# Patient Record
Sex: MALE | Race: WHITE | NOT HISPANIC OR LATINO | URBAN - METROPOLITAN AREA
[De-identification: names, ages, dates, MRNs, and addresses within clinical notes are randomized per-mention and may not be internally consistent; named-entity substitution may affect disease eponyms.]

---

## 2017-03-28 PROBLEM — 302914006 BARRETT'S ESOPHAGUS: Status: ACTIVE | Noted: 2017-03-28

## 2021-06-15 ENCOUNTER — OFFICE VISIT (OUTPATIENT)
Dept: URBAN - METROPOLITAN AREA CLINIC 27 | Facility: CLINIC | Age: 81
End: 2021-06-15

## 2021-06-16 ENCOUNTER — LAB OUTSIDE AN ENCOUNTER (OUTPATIENT)
Dept: URBAN - METROPOLITAN AREA CLINIC 121 | Facility: CLINIC | Age: 81
End: 2021-06-16

## 2021-06-16 LAB
B-12: 518
BASOPH COUNT: (no result)
BASOPHIL %: 0.5
EOS COUNT: (no result)
EOSINOPHIL %: 0.4
FERRITIN: 35
FOLATE: 10.3
HCT: 36.2
HGB: 11.6
IRON SATUR %: (no result)
IRON: 88
LYMPHS %: 27.1
MCH: 30.4
MCHC: (no result)
MCV: 95
MONOCYTE %: 12.5
MONOSCT AUTO: (no result)
PLATELETS: (no result)
PMN %: 59.5
RBC: (no result)
RDW: 13.6
RETIC COUNT: 1.3
TIBC: (no result)
WBC: (no result)
ZZ-GE-UNK: (no result)
ZZ-GE-UNK: (no result)

## 2021-08-02 ENCOUNTER — OFFICE VISIT (OUTPATIENT)
Dept: URBAN - METROPOLITAN AREA SURGERY CENTER 7 | Facility: SURGERY CENTER | Age: 81
End: 2021-08-02

## 2021-08-03 ENCOUNTER — LAB OUTSIDE AN ENCOUNTER (OUTPATIENT)
Dept: URBAN - METROPOLITAN AREA CLINIC 121 | Facility: CLINIC | Age: 81
End: 2021-08-03

## 2021-08-03 LAB
B-12: 544
BASOPH COUNT: (no result)
BASOPHIL %: 0.9
EOS COUNT: (no result)
EOSINOPHIL %: 0.5
FERRITIN: 32
FOLATE: 9.2
HCT: 37.2
HGB: 11.6
IRON SATUR %: (no result)
IRON: 58
LYMPHS %: 20.5
MCH: 29.7
MCHC: (no result)
MCV: 95.1
MONOCYTE %: 9.8
MONOSCT AUTO: (no result)
PLATELETS: (no result)
PMN %: 68.3
RBC: (no result)
RDW: 13.3
RETIC COUNT: 1.4
TIBC: (no result)
WBC: (no result)
ZZ-GE-UNK: (no result)
ZZ-GE-UNK: (no result)

## 2021-08-06 ENCOUNTER — OFFICE VISIT (OUTPATIENT)
Dept: URBAN - METROPOLITAN AREA CLINIC 27 | Facility: CLINIC | Age: 81
End: 2021-08-06

## 2021-10-19 ENCOUNTER — OFFICE VISIT (OUTPATIENT)
Dept: URBAN - METROPOLITAN AREA CLINIC 27 | Facility: CLINIC | Age: 81
End: 2021-10-19

## 2022-04-30 ENCOUNTER — TELEPHONE ENCOUNTER (OUTPATIENT)
Dept: URBAN - METROPOLITAN AREA CLINIC 121 | Facility: CLINIC | Age: 82
End: 2022-04-30

## 2022-04-30 RX ORDER — ALPRAZOLAM 0.25 MG/1
TABLET ORAL
OUTPATIENT
Start: 2015-07-14

## 2022-04-30 RX ORDER — FAMOTIDINE 10 MG
1 TABLET PO BID TABLET ORAL
OUTPATIENT
Start: 2017-03-28

## 2022-04-30 RX ORDER — ALPRAZOLAM 0.25 MG/1
TABLET ORAL
OUTPATIENT
Start: 2017-05-15 | End: 2019-11-08

## 2022-04-30 RX ORDER — ALPRAZOLAM 0.25 MG/1
TABLET ORAL
OUTPATIENT
Start: 2015-07-14 | End: 2017-03-28

## 2022-04-30 RX ORDER — OMEPRAZOLE 20 MG/1
1 CAPSULE PO QD CAPSULE, DELAYED RELEASE ORAL
OUTPATIENT
Start: 2015-07-27 | End: 2017-03-28

## 2022-04-30 RX ORDER — POLYETHYLENE GLYCOL 400 AND PROPYLENE GLYCOL 4; 3 MG/ML; MG/ML
SOLUTION/ DROPS OPHTHALMIC
OUTPATIENT
Start: 2017-03-28

## 2022-04-30 RX ORDER — OMEPRAZOLE 20 MG/1
1 CAPSULE PO QD CAPSULE, DELAYED RELEASE ORAL
OUTPATIENT
Start: 2015-07-27

## 2022-04-30 RX ORDER — OMEPRAZOLE 20 MG/1
1 CAPSULE PO QD CAPSULE, DELAYED RELEASE ORAL
OUTPATIENT
Start: 2015-07-14 | End: 2015-07-14

## 2022-04-30 RX ORDER — FAMOTIDINE 10 MG
TABLET ORAL
OUTPATIENT
Start: 2017-05-15 | End: 2019-11-08

## 2022-04-30 RX ORDER — ALPRAZOLAM 0.25 MG/1
TABLET ORAL
OUTPATIENT
Start: 2017-05-15

## 2022-04-30 RX ORDER — FAMOTIDINE 10 MG
TABLET ORAL
OUTPATIENT
Start: 2017-05-15

## 2022-04-30 RX ORDER — DUTASTERIDE 0.5 MG
CAPSULE ORAL
OUTPATIENT
Start: 2015-07-14

## 2022-04-30 RX ORDER — POLYETHYLENE GLYCOL 400 AND PROPYLENE GLYCOL 4; 3 MG/ML; MG/ML
SOLUTION/ DROPS OPHTHALMIC
OUTPATIENT
Start: 2017-03-28 | End: 2019-11-08

## 2022-04-30 RX ORDER — OMEPRAZOLE 20 MG/1
1 CAPSULE PO QD CAPSULE, DELAYED RELEASE ORAL
OUTPATIENT
Start: 2015-07-14

## 2022-04-30 RX ORDER — FAMOTIDINE 10 MG
1 TABLET PO BID TABLET ORAL
OUTPATIENT
Start: 2018-03-13

## 2022-04-30 RX ORDER — FAMOTIDINE 10 MG
1 TABLET PO BID TABLET ORAL
OUTPATIENT
Start: 2018-03-13 | End: 2019-11-08

## 2022-04-30 RX ORDER — OMEPRAZOLE 20 MG/1
TABLET, DELAYED RELEASE ORAL
OUTPATIENT
Start: 2015-07-14 | End: 2017-05-15

## 2022-04-30 RX ORDER — FAMOTIDINE 10 MG
1 TABLET PO BID TABLET ORAL
OUTPATIENT
Start: 2017-03-28 | End: 2017-03-28

## 2022-04-30 RX ORDER — DUTASTERIDE 0.5 MG
CAPSULE ORAL
OUTPATIENT
Start: 2015-07-14 | End: 2017-03-28

## 2022-05-01 ENCOUNTER — TELEPHONE ENCOUNTER (OUTPATIENT)
Dept: URBAN - METROPOLITAN AREA CLINIC 121 | Facility: CLINIC | Age: 82
End: 2022-05-01

## 2022-05-01 RX ORDER — FAMOTIDINE 10 MG
TAKE 1 TABLET TWICE A DAY TABLET ORAL
Status: ACTIVE | COMMUNITY
Start: 2019-06-10

## 2022-05-01 RX ORDER — UREA 10 %
LOTION (ML) TOPICAL
Status: ACTIVE | COMMUNITY
Start: 2021-06-15

## 2022-05-01 RX ORDER — ROSUVASTATIN CALCIUM 20 MG
TABLET ORAL
Status: ACTIVE | COMMUNITY
Start: 2015-07-14

## 2022-05-01 RX ORDER — METOPROLOL SUCCINATE 100 MG/1
TABLET, EXTENDED RELEASE ORAL
Status: ACTIVE | COMMUNITY
Start: 2015-07-14

## 2022-05-01 RX ORDER — PSYLLIUM HUSK 0.4 G
CAPSULE ORAL
Status: ACTIVE | COMMUNITY
Start: 2019-11-08

## 2022-05-01 RX ORDER — TAMSULOSIN HYDROCHLORIDE 0.4 MG/1
CAPSULE ORAL
Status: ACTIVE | COMMUNITY
Start: 2015-07-14

## 2022-05-01 RX ORDER — RIVAROXABAN 20 MG/1
TABLET, FILM COATED ORAL
Status: ACTIVE | COMMUNITY
Start: 2015-07-14

## 2022-05-01 RX ORDER — FAMOTIDINE 20 MG
1 TABLET PO BID TABLET ORAL
Status: ACTIVE | COMMUNITY
Start: 2020-05-01

## 2022-05-01 RX ORDER — ELECTROLYTES/DEXTROSE
SOLUTION, ORAL ORAL
Status: ACTIVE | COMMUNITY
Start: 2015-07-14

## 2022-05-01 RX ORDER — FINASTERIDE 5 MG/1
TABLET, FILM COATED ORAL
Status: ACTIVE | COMMUNITY
Start: 2019-11-08

## 2022-05-01 RX ORDER — CYANOCOBALAMIN (VITAMIN B-12) 100 MCG
TABLET ORAL
Status: ACTIVE | COMMUNITY
Start: 2019-11-08

## 2022-05-01 RX ORDER — LEVETIRACETAM 250 MG/1
TABLET, FILM COATED ORAL
Status: ACTIVE | COMMUNITY
Start: 2019-11-08

## 2022-07-20 ENCOUNTER — WEB ENCOUNTER (OUTPATIENT)
Dept: URBAN - METROPOLITAN AREA CLINIC 27 | Facility: CLINIC | Age: 82
End: 2022-07-20

## 2022-07-20 ENCOUNTER — DASHBOARD ENCOUNTERS (OUTPATIENT)
Age: 82
End: 2022-07-20

## 2022-07-20 ENCOUNTER — OFFICE VISIT (OUTPATIENT)
Dept: URBAN - METROPOLITAN AREA CLINIC 27 | Facility: CLINIC | Age: 82
End: 2022-07-20
Payer: MEDICARE

## 2022-07-20 VITALS
HEART RATE: 71 BPM | DIASTOLIC BLOOD PRESSURE: 69 MMHG | RESPIRATION RATE: 17 BRPM | TEMPERATURE: 97.1 F | SYSTOLIC BLOOD PRESSURE: 116 MMHG | HEIGHT: 67 IN | WEIGHT: 200 LBS | BODY MASS INDEX: 31.39 KG/M2

## 2022-07-20 DIAGNOSIS — C18.9 COLON CANCER: ICD-10-CM

## 2022-07-20 DIAGNOSIS — I10 ESSENTIAL (PRIMARY) HYPERTENSION: ICD-10-CM

## 2022-07-20 DIAGNOSIS — E66.3 OVERWEIGHT: ICD-10-CM

## 2022-07-20 PROBLEM — 238131007 OVERWEIGHT: Status: ACTIVE | Noted: 2019-11-08

## 2022-07-20 PROCEDURE — 99213 OFFICE O/P EST LOW 20 MIN: CPT | Performed by: INTERNAL MEDICINE

## 2022-07-20 RX ORDER — ELECTROLYTES/DEXTROSE
SOLUTION, ORAL ORAL
Status: ACTIVE | COMMUNITY
Start: 2015-07-14

## 2022-07-20 RX ORDER — LEVETIRACETAM 250 MG/1
TABLET, FILM COATED ORAL
Status: ACTIVE | COMMUNITY
Start: 2019-11-08

## 2022-07-20 RX ORDER — CYANOCOBALAMIN (VITAMIN B-12) 100 MCG
TABLET ORAL
Status: ACTIVE | COMMUNITY
Start: 2019-11-08

## 2022-07-20 RX ORDER — UREA 10 %
LOTION (ML) TOPICAL
Status: ACTIVE | COMMUNITY
Start: 2021-06-15

## 2022-07-20 RX ORDER — FAMOTIDINE 10 MG
TAKE 1 TABLET TWICE A DAY TABLET ORAL
Status: ACTIVE | COMMUNITY
Start: 2019-06-10

## 2022-07-20 RX ORDER — RIVAROXABAN 20 MG/1
TABLET, FILM COATED ORAL
Status: ACTIVE | COMMUNITY
Start: 2015-07-14

## 2022-07-20 RX ORDER — PSYLLIUM HUSK 0.4 G
CAPSULE ORAL
Status: ACTIVE | COMMUNITY
Start: 2019-11-08

## 2022-07-20 RX ORDER — METOPROLOL SUCCINATE 100 MG/1
TABLET, EXTENDED RELEASE ORAL
Status: ACTIVE | COMMUNITY
Start: 2015-07-14

## 2022-07-20 RX ORDER — TAMSULOSIN HYDROCHLORIDE 0.4 MG/1
CAPSULE ORAL
Status: ACTIVE | COMMUNITY
Start: 2015-07-14

## 2022-07-20 RX ORDER — FAMOTIDINE 20 MG
1 TABLET PO BID TABLET ORAL
Status: ACTIVE | COMMUNITY
Start: 2020-05-01

## 2022-07-20 RX ORDER — FINASTERIDE 5 MG/1
TABLET, FILM COATED ORAL
Status: ACTIVE | COMMUNITY
Start: 2019-11-08

## 2022-07-20 RX ORDER — ROSUVASTATIN CALCIUM 20 MG
TABLET ORAL
Status: ACTIVE | COMMUNITY
Start: 2015-07-14

## 2022-07-20 NOTE — HPI-TODAY'S VISIT:
ThisIs a 82-year-old male seen in consultation for history of colon cancer.  He underwent right hemicolectomy last year.  He has done well.  Uses a walker.  He is accompanied by his son.  He states his bowel movements are regular although he will sometimes have a couple days without a bowel movement but this is not an issue for him.  His oncologist Dr Momin has recommended a follow-up colonoscopy.

## 2022-08-02 ENCOUNTER — TELEPHONE ENCOUNTER (OUTPATIENT)
Dept: URBAN - METROPOLITAN AREA CLINIC 27 | Facility: CLINIC | Age: 82
End: 2022-08-02

## 2022-08-03 ENCOUNTER — TELEPHONE ENCOUNTER (OUTPATIENT)
Dept: URBAN - METROPOLITAN AREA CLINIC 6 | Facility: CLINIC | Age: 82
End: 2022-08-03

## 2022-08-03 ENCOUNTER — TELEPHONE ENCOUNTER (OUTPATIENT)
Dept: URBAN - METROPOLITAN AREA CLINIC 27 | Facility: CLINIC | Age: 82
End: 2022-08-03

## 2022-08-04 ENCOUNTER — TELEPHONE ENCOUNTER (OUTPATIENT)
Dept: URBAN - METROPOLITAN AREA CLINIC 27 | Facility: CLINIC | Age: 82
End: 2022-08-04

## 2022-08-04 RX ORDER — FAMOTIDINE 20 MG
1 TABLET PO BID TABLET ORAL TWICE DAILY
Refills: 1

## 2022-08-11 PROBLEM — 59621000 ESSENTIAL HYPERTENSION: Status: ACTIVE | Noted: 2022-07-20

## 2022-08-11 PROBLEM — 363406005 MALIGNANT TUMOR OF COLON: Status: ACTIVE | Noted: 2022-07-20

## 2022-08-17 ENCOUNTER — TELEPHONE ENCOUNTER (OUTPATIENT)
Dept: URBAN - METROPOLITAN AREA CLINIC 27 | Facility: CLINIC | Age: 82
End: 2022-08-17

## 2022-08-17 RX ORDER — FAMOTIDINE 20 MG/1
1 TABLET TWICE A DAY TABLET, FILM COATED ORAL TWICE A DAY
Qty: 60 TABLET | Refills: 1 | OUTPATIENT

## 2022-09-15 ENCOUNTER — OFFICE VISIT (OUTPATIENT)
Dept: URBAN - METROPOLITAN AREA SURGERY CENTER 7 | Facility: SURGERY CENTER | Age: 82
End: 2022-09-15

## 2022-09-29 ENCOUNTER — CLAIMS CREATED FROM THE CLAIM WINDOW (OUTPATIENT)
Dept: URBAN - METROPOLITAN AREA SURGERY CENTER 7 | Facility: SURGERY CENTER | Age: 82
End: 2022-09-29
Payer: MEDICARE

## 2022-09-29 DIAGNOSIS — D12.4 ADENOMA OF DESCENDING COLON: ICD-10-CM

## 2022-09-29 DIAGNOSIS — Z85.038 H/O COLON CANCER, STAGE I: ICD-10-CM

## 2022-09-29 PROCEDURE — 45385 COLONOSCOPY W/LESION REMOVAL: CPT | Performed by: INTERNAL MEDICINE

## 2022-09-29 PROCEDURE — G8907 PT DOC NO EVENTS ON DISCHARG: HCPCS | Performed by: INTERNAL MEDICINE

## 2022-09-29 PROCEDURE — G8907 PT DOC NO EVENTS ON DISCHARG: HCPCS | Performed by: CLINIC/CENTER

## 2022-09-29 PROCEDURE — 45385 COLONOSCOPY W/LESION REMOVAL: CPT | Performed by: CLINIC/CENTER

## 2022-09-29 RX ORDER — PSYLLIUM HUSK 0.4 G
CAPSULE ORAL
Status: ACTIVE | COMMUNITY
Start: 2019-11-08

## 2022-09-29 RX ORDER — ELECTROLYTES/DEXTROSE
SOLUTION, ORAL ORAL
Status: ACTIVE | COMMUNITY
Start: 2015-07-14

## 2022-09-29 RX ORDER — FINASTERIDE 5 MG/1
TABLET, FILM COATED ORAL
Status: ACTIVE | COMMUNITY
Start: 2019-11-08

## 2022-09-29 RX ORDER — RIVAROXABAN 20 MG/1
TABLET, FILM COATED ORAL
Status: ACTIVE | COMMUNITY
Start: 2015-07-14

## 2022-09-29 RX ORDER — ROSUVASTATIN CALCIUM 20 MG
TABLET ORAL
Status: ACTIVE | COMMUNITY
Start: 2015-07-14

## 2022-09-29 RX ORDER — LEVETIRACETAM 250 MG/1
TABLET, FILM COATED ORAL
Status: ACTIVE | COMMUNITY
Start: 2019-11-08

## 2022-09-29 RX ORDER — FAMOTIDINE 20 MG
1 TABLET PO BID TABLET ORAL TWICE DAILY
Refills: 1 | Status: ACTIVE | COMMUNITY

## 2022-09-29 RX ORDER — TAMSULOSIN HYDROCHLORIDE 0.4 MG/1
CAPSULE ORAL
Status: ACTIVE | COMMUNITY
Start: 2015-07-14

## 2022-09-29 RX ORDER — FAMOTIDINE 20 MG/1
1 TABLET TWICE A DAY TABLET, FILM COATED ORAL TWICE A DAY
Qty: 60 TABLET | Refills: 1 | Status: ACTIVE | COMMUNITY

## 2022-09-29 RX ORDER — CYANOCOBALAMIN (VITAMIN B-12) 100 MCG
TABLET ORAL
Status: ACTIVE | COMMUNITY
Start: 2019-11-08

## 2022-09-29 RX ORDER — FAMOTIDINE 10 MG
TAKE 1 TABLET TWICE A DAY TABLET ORAL
Status: ACTIVE | COMMUNITY
Start: 2019-06-10

## 2022-09-29 RX ORDER — METOPROLOL SUCCINATE 100 MG/1
TABLET, EXTENDED RELEASE ORAL
Status: ACTIVE | COMMUNITY
Start: 2015-07-14

## 2022-09-29 RX ORDER — UREA 10 %
LOTION (ML) TOPICAL
Status: ACTIVE | COMMUNITY
Start: 2021-06-15

## 2022-11-09 ENCOUNTER — ERX REFILL RESPONSE (OUTPATIENT)
Dept: URBAN - METROPOLITAN AREA CLINIC 27 | Facility: CLINIC | Age: 82
End: 2022-11-09

## 2022-11-09 RX ORDER — FAMOTIDINE 20 MG/1
1 TABLET TWICE A DAY TABLET, FILM COATED ORAL TWICE A DAY
Qty: 60 TABLET | Refills: 1 | OUTPATIENT

## 2022-11-09 RX ORDER — FAMOTIDINE 20 MG/1
TAKE 1 TABLET TWICE A DAY TABLET, FILM COATED ORAL
Qty: 60 TABLET | Refills: 11 | OUTPATIENT

## 2023-08-02 ENCOUNTER — TELEPHONE ENCOUNTER (OUTPATIENT)
Dept: URBAN - METROPOLITAN AREA CLINIC 27 | Facility: CLINIC | Age: 83
End: 2023-08-02

## 2023-08-02 RX ORDER — FAMOTIDINE 20 MG/1
TAKE 1 TABLET TABLET, FILM COATED ORAL TWICE A DAY
Qty: 180 | Refills: 2

## 2025-07-22 ENCOUNTER — TELEPHONE ENCOUNTER (OUTPATIENT)
Dept: URBAN - METROPOLITAN AREA CLINIC 27 | Facility: CLINIC | Age: 85
End: 2025-07-22

## 2025-08-07 ENCOUNTER — OFFICE VISIT (OUTPATIENT)
Dept: URBAN - METROPOLITAN AREA CLINIC 27 | Facility: CLINIC | Age: 85
End: 2025-08-07
Payer: MEDICARE

## 2025-08-07 DIAGNOSIS — C18.9 COLON CANCER: ICD-10-CM

## 2025-08-07 DIAGNOSIS — K22.70 BARRETT'S ESOPHAGUS WITHOUT DYSPLASIA: ICD-10-CM

## 2025-08-07 DIAGNOSIS — E66.3 OVERWEIGHT: ICD-10-CM

## 2025-08-07 DIAGNOSIS — R74.8 ABNORMAL LIVER ENZYMES: ICD-10-CM

## 2025-08-07 DIAGNOSIS — I10 ESSENTIAL (PRIMARY) HYPERTENSION: ICD-10-CM

## 2025-08-07 DIAGNOSIS — I48.91 ATRIAL FIBRILLATION: ICD-10-CM

## 2025-08-07 DIAGNOSIS — Z79.01 ANTICOAGULANT LONG-TERM USE: ICD-10-CM

## 2025-08-07 PROCEDURE — 99214 OFFICE O/P EST MOD 30 MIN: CPT | Performed by: INTERNAL MEDICINE

## 2025-08-07 RX ORDER — PSYLLIUM HUSK 0.4 G
CAPSULE ORAL
Status: ACTIVE | COMMUNITY
Start: 2019-11-08

## 2025-08-07 RX ORDER — LEVETIRACETAM 250 MG/1
TABLET, FILM COATED ORAL
Status: ACTIVE | COMMUNITY
Start: 2019-11-08

## 2025-08-07 RX ORDER — TAMSULOSIN HYDROCHLORIDE 0.4 MG/1
CAPSULE ORAL
Status: ACTIVE | COMMUNITY
Start: 2015-07-14

## 2025-08-07 RX ORDER — FAMOTIDINE 20 MG/1
1 TABLET PO BID TABLET, FILM COATED ORAL TWICE DAILY
Refills: 1 | Status: ACTIVE | COMMUNITY

## 2025-08-07 RX ORDER — METOPROLOL SUCCINATE 100 MG/1
TABLET, EXTENDED RELEASE ORAL
Status: ACTIVE | COMMUNITY
Start: 2015-07-14

## 2025-08-07 RX ORDER — CYANOCOBALAMIN (VITAMIN B-12) 100 MCG
TABLET ORAL
Status: ACTIVE | COMMUNITY
Start: 2019-11-08

## 2025-08-07 RX ORDER — FINASTERIDE 5 MG/1
TABLET, FILM COATED ORAL
Status: ACTIVE | COMMUNITY
Start: 2019-11-08

## 2025-08-07 RX ORDER — RIVAROXABAN 20 MG/1
TABLET, FILM COATED ORAL
Status: ACTIVE | COMMUNITY
Start: 2015-07-14

## 2025-08-07 RX ORDER — FAMOTIDINE 20 MG/1
TAKE 1 TABLET TABLET, FILM COATED ORAL TWICE A DAY
Qty: 180 | Refills: 2 | Status: ACTIVE | COMMUNITY

## 2025-08-07 RX ORDER — UREA 10 %
LOTION (ML) TOPICAL
Status: ACTIVE | COMMUNITY
Start: 2021-06-15

## 2025-08-07 RX ORDER — FAMOTIDINE 10 MG
TAKE 1 TABLET TWICE A DAY TABLET ORAL
Status: ACTIVE | COMMUNITY
Start: 2019-06-10

## 2025-08-07 RX ORDER — ELECTROLYTES/DEXTROSE
SOLUTION, ORAL ORAL
Status: ACTIVE | COMMUNITY
Start: 2015-07-14

## 2025-08-07 RX ORDER — ROSUVASTATIN CALCIUM 20 MG
TABLET ORAL
Status: ACTIVE | COMMUNITY
Start: 2015-07-14

## 2025-08-11 LAB
ACTIN (SMOOTH MUSCLE) ANTIBODY (IGG): <20
ANA SCREEN, IFA: POSITIVE
CERULOPLASMIN: 28
FERRITIN, SERUM: 406
HEPATITIS A AB, TOTAL: (no result)
HEPATITIS B SURFACE AB IMMUNITY, QN: <5
HEPATITIS B SURFACE ANTIGEN: (no result)
HEPATITIS C ANTIBODY (REFL): (no result)
IRON BIND.CAP.(TIBC): 289
IRON SATURATION: 31
IRON: 89